# Patient Record
Sex: FEMALE | Race: WHITE | URBAN - METROPOLITAN AREA
[De-identification: names, ages, dates, MRNs, and addresses within clinical notes are randomized per-mention and may not be internally consistent; named-entity substitution may affect disease eponyms.]

---

## 2017-04-21 ENCOUNTER — OFFICE VISIT (OUTPATIENT)
Dept: OPHTHALMOLOGY | Facility: CLINIC | Age: 37
End: 2017-04-21

## 2017-04-21 DIAGNOSIS — Z53.9 ERRONEOUS ENCOUNTER--DISREGARD: Primary | ICD-10-CM

## 2017-04-21 ASSESSMENT — SLIT LAMP EXAM - LIDS
COMMENTS: NORMAL
COMMENTS: NORMAL

## 2017-04-21 ASSESSMENT — EXTERNAL EXAM - LEFT EYE: OS_EXAM: NORMAL

## 2017-04-21 ASSESSMENT — TONOMETRY
IOP_METHOD: TONOPEN
OD_IOP_MMHG: 15

## 2017-04-21 ASSESSMENT — VISUAL ACUITY
OD_SC: 20/20
METHOD: SNELLEN - LINEAR

## 2017-04-21 ASSESSMENT — EXTERNAL EXAM - RIGHT EYE: OD_EXAM: NORMAL

## 2017-04-21 NOTE — MR AVS SNAPSHOT
After Visit Summary   2017    Kamille Arora    MRN: [id number]           Patient Information     Date Of Birth          1980        Visit Information        Provider Department      2017 6:16 PM B[facility]quez [facility]        Today's Diagnoses     ERRONEOUS ENCOUNTER--DISREGARD    -  1       Follow-ups after your visit        Follow-up notes from your care team     Return in about 4 weeks (around 2017) for Follow Up.      Who to contact     Please call your clinic at [phone] to:    Ask questions about your health    Make or cancel appointments    Discuss your medicines    Learn about your test results    Speak to your doctor   If you have compliments or concerns about an experience at your clinic, or if you wish to file a complaint, please contact [facility] Patient Relations at [phone] or email us at [email or web address]         Additional Information About Your Visit        MyChart Information     Dudat is an electronic gateway that provides easy, online access to your medical records. With GutCheck, you can request a clinic appointment, read your test results, renew a prescription or communicate with your care team.     To sign up for Dudat visit the website at [email or web address]   You will be asked to enter the access code listed below, as well as some personal information. Please follow the directions to create your username and password.     Your access code is: [email or web address]  Expires: 2017  7:53 AM     Your access code will  in 90 days. If you need help or a new code, please contact your [facility] Clinic or call [phone] for assistance.        Care EveryWhere ID     This is your Care EveryWhere ID. This could be used by other organizations to access your [place] medical records  [id number]         Blood Pressure from Last 3 Encounters:   No data found for BP    Weight  from Last 3 Encounters:   No data found for Wt              Today, you had the following     No orders found for display       Primary Care Provider    None Specified       No primary provider on file.        Thank you!     Thank you for choosing EYE CLINIC  for your care. Our goal is always to provide you with excellent care. Hearing back from our patients is one way we can continue to improve our services. Please take a few minutes to complete the written survey that you may receive in the mail after your visit with us. Thank you!             Your Updated Medication List - Protect others around you: Learn how to safely use, store and throw away your medicines at www.disposemymeds.org.      Notice  As of 4/21/2017 11:59 PM    You have not been prescribed any medications.

## 2017-04-21 NOTE — PROGRESS NOTES
Kamille Arora is a 37 year old female who presents complaining of new floater in right eye that started today. Reports it does not move. Denies flashing lights, curtain, scotoma. Has had some floaters previously. Dilated and tonopen pressure by herself.    Floater  No juliette sign, tear, hole identified  Given retinal detachment precautions, to return earlier if having flashing lights, new floaters, curtain coming down  Some epithelial irregularity, recommended artificial tears.  Re-examine in 1 month.    Bola Bojorquez MD

## 2017-06-19 ENCOUNTER — OFFICE VISIT (OUTPATIENT)
Dept: AUDIOLOGY | Facility: CLINIC | Age: 37
End: 2017-06-19

## 2017-06-19 DIAGNOSIS — H90.72 MIXED HEARING LOSS OF LEFT EAR: Primary | ICD-10-CM

## 2017-06-19 DIAGNOSIS — H93.8X3 FULLNESS IN EAR, BILATERAL: ICD-10-CM

## 2017-06-19 NOTE — MR AVS SNAPSHOT
After Visit Summary   2017    Kamille Arora    MRN: [id number]           Patient Information     Date Of Birth          1980        Visit Information        Provider Department      2017 8:30 AM Michelle Escoto AuD [facility] Audiology        Today's Diagnoses     Mixed hearing loss of left ear    -  1    Fullness in ear, bilateral           Follow-ups after your visit        Your next 10 appointments already scheduled     2017 10:30 AM CDT   (Arrive by 10:15 AM)   NEW NEUROTOLOGY VISIT with Rick L Nissen, MD   [facility] Ear Nose and Throat ([facility])    [address] [place] [id number] [phone]              Who to contact     Please call your clinic at [phone] to:    Ask questions about your health    Make or cancel appointments    Discuss your medicines    Learn about your test results    Speak to your doctor   If you have compliments or concerns about an experience at your clinic, or if you wish to file a complaint, please contact [facility] Patient Relations at [phone] or email us at [email or web address]         Additional Information About Your Visit        MyChart Information     KnowledgeTreet is an electronic gateway that provides easy, online access to your medical records. With Agrivi, you can request a clinic appointment, read your test results, renew a prescription or communicate with your care team.     To sign up for KnowledgeTreet visit the website at [email or web address]   You will be asked to enter the access code listed below, as well as some personal information. Please follow the directions to create your username and password.     Your access code is: [email or web address]  Expires: 2017  8:17 AM     Your access code will  in 90 days. If you need help or a new code, please contact your [facility] or call [phone] for  assistance.        Care EveryWhere ID     This is your Care EveryWhere ID. This could be used by other organizations to access your [place] medical records  [id number]         Blood Pressure from Last 3 Encounters:   No data found for BP    Weight from Last 3 Encounters:   No data found for Wt              We Performed the Following     AUDIOGRAM/TYMPANOGRAM - INTERFACE     [facility] (39435[facility]     Tymps / Reflex   ([id number])        Primary Care Provider    None Specified       No primary provider on file.        Thank you!     Thank you for choosing [facility] AUDIOLOGY  for your care. Our goal is always to provide you with excellent care. Hearing back from our patients is one way we can continue to improve our services. Please take a few minutes to complete the written survey that you may receive in the mail after your visit with us. Thank you!             Your Updated Medication List - Protect others around you: Learn how to safely use, store and throw away your medicines at www.disposemymeds.org.      Notice  As of 6/19/2017 11:23 AM    You have not been prescribed any medications.

## 2017-06-19 NOTE — PROGRESS NOTES
AUDIOLOGY REPORT    SUMMARY: Audiology visit completed. See audiogram for results.      RECOMMENDATIONS: Follow-up with ENT.      Misha Jang  Audiologist  [place] License  #[id number]

## 2017-06-20 ENCOUNTER — OFFICE VISIT (OUTPATIENT)
Dept: OTOLARYNGOLOGY | Facility: CLINIC | Age: 37
End: 2017-06-20

## 2017-06-20 VITALS — WEIGHT: 228 LBS | BODY MASS INDEX: 36.64 KG/M2 | HEIGHT: 66 IN

## 2017-06-20 DIAGNOSIS — H69.92 ETD (EUSTACHIAN TUBE DYSFUNCTION), LEFT: ICD-10-CM

## 2017-06-20 DIAGNOSIS — H90.12 CONDUCTIVE HEARING LOSS IN LEFT EAR: Primary | ICD-10-CM

## 2017-06-20 RX ORDER — FLUTICASONE PROPIONATE 50 MCG
2 SPRAY, SUSPENSION (ML) NASAL DAILY
Qty: 1 BOTTLE | Refills: 2 | Status: SHIPPED | OUTPATIENT
Start: 2017-06-20 | End: 2017-07-20

## 2017-06-20 RX ORDER — PNV NO.95/FERROUS FUM/FOLIC AC 28MG-0.8MG
TABLET ORAL
COMMUNITY

## 2017-06-20 RX ORDER — COVID-19 ANTIGEN TEST
KIT MISCELLANEOUS
COMMUNITY
Start: 2012-12-18

## 2017-06-20 RX ORDER — MULTIVIT-MIN/IRON/FOLIC ACID/K 18-600-40
CAPSULE ORAL
COMMUNITY

## 2017-06-20 RX ORDER — CALCIUM CARBONATE 500 MG/1
TABLET, CHEWABLE ORAL
COMMUNITY
Start: 2009-07-17

## 2017-06-20 RX ORDER — MULTIVITAMIN,THER AND MINERALS
TABLET ORAL
COMMUNITY
Start: 2005-02-09

## 2017-06-20 ASSESSMENT — PAIN SCALES - GENERAL: PAINLEVEL: NO PAIN (0)

## 2017-06-20 NOTE — PATIENT INSTRUCTIONS
Thank you for your visit to Dr. Rick Nissen, Neurotologist, today. A prescription for Flonase has been sent to the [facility] in [employer] on [address].    Thank you.

## 2017-06-20 NOTE — PROGRESS NOTES
Dear  No primary care provider on file.:    I had the pleasure of meeting Kamille Arora in consultation today at the [facility] at your request.    HISTORY OF PRESENT ILLNESS: The patient is a 37-year-old in today for assessment of left hearing loss.  Two or three weeks ago she was having a lot of coughing, some sinus symptoms and both ears felt plugged and full.  She was seen in urgent care and felt to have double ear infection and was put on Augmentin.  She had previously been on a Z-ESTER.  Her symptoms now have gotten better other than the left ear still feels plugged and full.  Hearing seems very muffled.  She denies any dizziness.  There has been no drainage.         ALLERGIES:  No Known Allergies    HABITS:   Alcohol use No    History   Smoking Status     Never Smoker   Smokeless Tobacco     Not on file         PAST MEDICAL HISTORY: Please see today's intake form (for the remainder of the PMH) which I reviewed and signed.  Past Medical History:   Diagnosis Date     Conductive hearing loss      Recurrent otitis media      Sensorineural hearing loss      Tinnitus        FAMILY HISTORY/SOCIAL HISTORY:   Family History   Problem Relation Age of Onset     DIABETES Mother      Thyroid Disease Mother      HEART DISEASE Maternal Grandfather            Social History     Social History     Marital status:      Spouse name: N/A     Number of children: N/A     Years of education: N/A     Occupational History     Not on file.     Social History Main Topics     Smoking status: Never Smoker     Smokeless tobacco: Not on file     Alcohol use No     Drug use: No     Sexual activity: Yes     Partners: Male     Birth control/ protection: None     Other Topics Concern     Not on file     Social History Narrative     No narrative on file       REVIEW OF SYSTEMS: Please see today's intake form (for the remainder of the ROS) which I have reviewed and signed.    PHYSICIAL  EXAMINATION:  Constitutional: The patient was well-groomed and in no acute distress.   Skin: Warm and pink.  Psychiatric: The patient's affect was calm, cooperative, and appropriate.   Respiratory: Breathing comfortably without stridor or exertion of accessory muscles.  Eyes: Pupils were equal and reactive. Extraocular movement intact.   Head: Normocephalic and atraumatic. No lesions or scars.  Ears: Both ears are examined under the microscope.  Under high-powered magnification, the right side was cleaned with curettes of cerumen.  Following cleaning, the tympanic membrane and middle ear look normal.  The left ear was cleaned and examined using the microscope, curette and similar techniques.  Tympanic membrane shows some mild retraction but does seem to move with Valsalva.  Just does not clear completely for her.   Nose: Sinuses were nontender. Anterior rhinoscopy revealed midline septum and absence of purulence or polyps.  Oral Cavity: Normal tongue, floor of moth, buccal mucosa, and palate. No lesions or masses on inspection or palpation. No abnormal lymph tissue in the oropharynx.   Neck: The parotid is soft without masses. Supple with normal laryngeal and tracheal landmarks.   Lymphatic: There is no palpable lymphadenopathy or other masses in the neck.   Neurologic: Alert and oriented x 3. Cranial nerves III-XI within normal limits. Voice quality normal.  Cerebellar Function Tests:  Grossly normal    Audiogram:  AUDIOGRAM:  Audiogram performed shows normal hearing on the right ear.  The left ear shows a similar nerve line with a mild conductive component from 250-1000 Hz.  Shallow peak on the left tympanogram as well.  Tuning forks show positive responses in each ear, but the Carrasquillo does lateralize to the left.         IMPRESSION AND PLAN: I talked with her for some time and went over what looks to be left eustachian tube dysfunction.  We discussed this in detail.  I talked to her about frequent Valsalvas and  going on a nasal steroid spray to help open eustachian tube.  This was discussed in detail.  Multiple questions answered.  We are going to give this a trial and if it does not clear completely, we will see her back in a month.  She will go on Flonase two puffs once a day in each nostril on a regular basis and to finish the bottle.  Frequent Valsalvas also encouraged.  We will see her back in one month if symptoms do not clear, may consider a tube.           Thank you very much for the opportunity to participate in the care of your patient.    Rick L Nissen MD

## 2017-06-20 NOTE — LETTER
Date:June 28, 2017      Patient was self referred, no letter generated. Do not send.        [facility] Health Information

## 2017-06-20 NOTE — MR AVS SNAPSHOT
After Visit Summary   2017    Kamille Arora    MRN: [id number]           Patient Information     Date Of Birth          1980        Visit Information        Provider Department      2017 10:30 AM Nissen, Rick L, MD [facility] N[facility] Thro[facility]        Today's Diagnoses     Mixed hearing loss    -  1      Care Instructions    Thank you for your visit to Dr. Rick Nissen, Neurotologist, today. A prescription for Flonase has been sent to the [facility] in [employer] on [facility] [address].    Thank you.          Follow-ups after your visit        Who to contact     Please call your clinic at [phone] to:    Ask questions about your health    Make or cancel appointments    Discuss your medicines    Learn about your test results    Speak to your doctor   If you have compliments or concerns about an experience at your clinic, or if you wish to file a complaint, please contact [facility] Patient Relations at [phone] or email us at [email or web address]         Additional Information About Your Visit        MyChart Information     [employer] is an electronic gateway that provides easy, online access to your medical records. With [employer] you can request a clinic appointment, read your test results, renew a prescription or communicate with your care team.     To sign up for [employer] visit the website at [email or web address]   You will be asked to enter the access code listed below, as well as some personal information. Please follow the directions to create your username and password.     Your access code is: [email or web address]  Expires: 2017  8:17 AM     Your access code will  in 90 days. If you need help or a new code, please contact your [facility] or call [phone] for assistance.        Care EveryWhere ID     This is your Care EveryWhere ID. This could be used by other organizations to access your [place]  "medical records  [id number]        Your Vitals Were     Height BMI (Body Mass Index)                1.664 m (5' 5.5\") 37.36 kg/m2           Blood Pressure from Last 3 Encounters:   No data found for BP    Weight from Last 3 Encounters:   06/20/17 103.4 kg (228 lb)              Today, you had the following     No orders found for display         Today's Medication Changes          These changes are accurate as of: 6/20/17 11:17 AM.  If you have any questions, ask your nurse or doctor.               Start taking these medicines.        Dose/Directions    fluticasone 50 MCG/ACT spray   Commonly known as:  FLONASE   Used for:  Mixed hearing loss   Started by:  Nissen, Rick L, MD        Dose:  2 spray   Spray 2 sprays into both nostrils daily   Quantity:  1 Bottle   Refills:  2            Where to get your medicines      These medications were sent to [place] [address] [place] [address] [address]     Phone:  [phone]     fluticasone 50 MCG/ACT spray                Primary Care Provider    None Specified       No primary provider on file.        Thank you!     Thank you for choosing [facility] EAR NOSE AND THROAT  for your care. Our goal is always to provide you with excellent care. Hearing back from our patients is one way we can continue to improve our services. Please take a few minutes to complete the written survey that you may receive in the mail after your visit with us. Thank you!             Your Updated Medication List - Protect others around you: Learn how to safely use, store and throw away your medicines at www.disposemymeds.org.          This list is accurate as of: 6/20/17 11:17 AM.  Always use your most recent med list.                   Brand Name Dispense Instructions for use    ANTACID 500 MG chewable tablet   Generic drug:  calcium carbonate          fluticasone 50 MCG/ACT spray    FLONASE    1 Bottle    Spray 2 sprays into both nostrils daily "       naproxen sodium 220 MG capsule          Prenatal Vitamin 27-0.8 MG Tabs          vitamin  s/Minerals Tabs          Vitamin C 500 MG Caps

## 2017-06-20 NOTE — LETTER
6/20/2017       RE: Kamille Arora  [address]  [place] [address]     Dear Colleague,    Thank you for referring your patient, Kamille Arora, to the [facility] EAR NOSE AND THROAT at [facility]. Please see a copy of my visit note below.    Dear Dr. Yusuf primary care provider on file.:    I had the pleasure of meeting Kamille Arora in consultation today at the [facility] Otolaryngology Clinic at your request.    HISTORY OF PRESENT ILLNESS: The patient is a 37-year-old in today for assessment of left hearing loss.  Two or three weeks ago she was having a lot of coughing, some sinus symptoms and both ears felt plugged and full.  She was seen in urgent care and felt to have double ear infection and was put on Augmentin.  She had previously been on a Z-ESTER.  Her symptoms now have gotten better other than the left ear still feels plugged and full.  Hearing seems very muffled.  She denies any dizziness.  There has been no drainage.         ALLERGIES:  No Known Allergies    HABITS:   Alcohol use No    History   Smoking Status     Never Smoker   Smokeless Tobacco     Not on file         PAST MEDICAL HISTORY: Please see today's intake form (for the remainder of the PMH) which I reviewed and signed.  Past Medical History:   Diagnosis Date     Conductive hearing loss      Recurrent otitis media      Sensorineural hearing loss      Tinnitus        FAMILY HISTORY/SOCIAL HISTORY:   Family History   Problem Relation Age of Onset     DIABETES Mother      Thyroid Disease Mother      HEART DISEASE Maternal Grandfather            Social History     Social History     Marital status:      Spouse name: N/A     Number of children: N/A     Years of education: N/A     Occupational History     Not on file.     Social History Main Topics     Smoking status: Never Smoker     Smokeless tobacco: Not on file     Alcohol use No     Drug use: No     Sexual activity: Yes     Partners:  Male     Birth control/ protection: None     Other Topics Concern     Not on file     Social History Narrative     No narrative on file       REVIEW OF SYSTEMS: Please see today's intake form (for the remainder of the ROS) which I have reviewed and signed.    PHYSICIAL EXAMINATION:  Constitutional: The patient was well-groomed and in no acute distress.   Skin: Warm and pink.  Psychiatric: The patient's affect was calm, cooperative, and appropriate.   Respiratory: Breathing comfortably without stridor or exertion of accessory muscles.  Eyes: Pupils were equal and reactive. Extraocular movement intact.   Head: Normocephalic and atraumatic. No lesions or scars.  Ears: Both ears are examined under the microscope.  Under high-powered magnification, the right side was cleaned with curettes of cerumen.  Following cleaning, the tympanic membrane and middle ear look normal.  The left ear was cleaned and examined using the microscope, curette and similar techniques.  Tympanic membrane shows some mild retraction but does seem to move with Valsalva.  Just does not clear completely for her.   Nose: Sinuses were nontender. Anterior rhinoscopy revealed midline septum and absence of purulence or polyps.  Oral Cavity: Normal tongue, floor of moth, buccal mucosa, and palate. No lesions or masses on inspection or palpation. No abnormal lymph tissue in the oropharynx.   Neck: The parotid is soft without masses. Supple with normal laryngeal and tracheal landmarks.   Lymphatic: There is no palpable lymphadenopathy or other masses in the neck.   Neurologic: Alert and oriented x 3. Cranial nerves III-XI within normal limits. Voice quality normal.  Cerebellar Function Tests:  Grossly normal    Audiogram:  AUDIOGRAM:  Audiogram performed shows normal hearing on the right ear.  The left ear shows a similar nerve line with a mild conductive component from 250-1000 Hz.  Shallow peak on the left tympanogram as well.  Tuning forks show positive  responses in each ear, but the Carrasquillo does lateralize to the left.         IMPRESSION AND PLAN: I talked with her for some time and went over what looks to be left eustachian tube dysfunction.  We discussed this in detail.  I talked to her about frequent Valsalvas and going on a nasal steroid spray to help open eustachian tube.  This was discussed in detail.  Multiple questions answered.  We are going to give this a trial and if it does not clear completely, we will see her back in a month.  She will go on Flonase two puffs once a day in each nostril on a regular basis and to finish the bottle.  Frequent Valsalvas also encouraged.  We will see her back in one month if symptoms do not clear, may consider a tube.           Thank you very much for the opportunity to participate in the care of your patient.    Rick L Nissen MD          Again, thank you for allowing me to participate in the care of your patient.      Sincerely,    Rick L. Nissen, MD

## 2022-11-07 ENCOUNTER — LAB REQUISITION (OUTPATIENT)
Dept: LAB | Facility: CLINIC | Age: 42
End: 2022-11-07
Payer: COMMERCIAL

## 2022-11-07 DIAGNOSIS — Z12.4 ENCOUNTER FOR SCREENING FOR MALIGNANT NEOPLASM OF CERVIX: ICD-10-CM

## 2022-11-07 PROCEDURE — 88141 CYTOPATH C/V INTERPRET: CPT | Performed by: PATHOLOGY

## 2022-11-07 PROCEDURE — 87624 HPV HI-RISK TYP POOLED RSLT: CPT | Mod: ORL | Performed by: NURSE PRACTITIONER

## 2022-11-07 PROCEDURE — G0145 SCR C/V CYTO,THINLAYER,RESCR: HCPCS | Mod: ORL | Performed by: NURSE PRACTITIONER

## 2022-11-10 LAB
BKR LAB AP GYN ADEQUACY: ABNORMAL
BKR LAB AP GYN INTERPRETATION: ABNORMAL
BKR LAB AP HPV REFLEX: ABNORMAL
BKR LAB AP LMP: ABNORMAL
BKR LAB AP PREVIOUS ABNL DX: ABNORMAL
BKR LAB AP PREVIOUS ABNORMAL: ABNORMAL
PATH REPORT.COMMENTS IMP SPEC: ABNORMAL
PATH REPORT.COMMENTS IMP SPEC: ABNORMAL
PATH REPORT.RELEVANT HX SPEC: ABNORMAL

## 2022-11-11 LAB
HUMAN PAPILLOMA VIRUS 16 DNA: NEGATIVE
HUMAN PAPILLOMA VIRUS 18 DNA: NEGATIVE
HUMAN PAPILLOMA VIRUS FINAL DIAGNOSIS: NORMAL
HUMAN PAPILLOMA VIRUS OTHER HR: NEGATIVE

## 2023-10-26 PROCEDURE — 88305 TISSUE EXAM BY PATHOLOGIST: CPT | Mod: 26 | Performed by: PATHOLOGY

## 2023-10-26 PROCEDURE — 88305 TISSUE EXAM BY PATHOLOGIST: CPT | Mod: TC,ORL | Performed by: OBSTETRICS & GYNECOLOGY

## 2023-10-27 ENCOUNTER — LAB REQUISITION (OUTPATIENT)
Dept: LAB | Facility: CLINIC | Age: 43
End: 2023-10-27
Payer: COMMERCIAL

## 2023-10-30 LAB
PATH REPORT.COMMENTS IMP SPEC: NORMAL
PATH REPORT.COMMENTS IMP SPEC: NORMAL
PATH REPORT.FINAL DX SPEC: NORMAL
PATH REPORT.GROSS SPEC: NORMAL
PATH REPORT.MICROSCOPIC SPEC OTHER STN: NORMAL
PATH REPORT.RELEVANT HX SPEC: NORMAL
PHOTO IMAGE: NORMAL

## 2024-07-29 ENCOUNTER — APPOINTMENT (OUTPATIENT)
Dept: URBAN - METROPOLITAN AREA CLINIC 259 | Age: 44
Setting detail: DERMATOLOGY
End: 2024-07-30

## 2024-07-29 DIAGNOSIS — D22 MELANOCYTIC NEVI: ICD-10-CM

## 2024-07-29 DIAGNOSIS — L91.8 OTHER HYPERTROPHIC DISORDERS OF THE SKIN: ICD-10-CM

## 2024-07-29 PROBLEM — D22.5 MELANOCYTIC NEVI OF TRUNK: Status: ACTIVE | Noted: 2024-07-29

## 2024-07-29 PROCEDURE — OTHER SHAVE REMOVAL: OTHER

## 2024-07-29 PROCEDURE — OTHER COUNSELING: OTHER

## 2024-07-29 PROCEDURE — 11200 RMVL SKIN TAGS UP TO&INC 15: CPT | Mod: 59

## 2024-07-29 PROCEDURE — 11300 SHAVE SKIN LESION 0.5 CM/<: CPT

## 2024-07-29 PROCEDURE — OTHER SKIN TAG REMOVAL: OTHER

## 2024-07-29 PROCEDURE — OTHER MIPS QUALITY: OTHER

## 2024-07-29 ASSESSMENT — LOCATION ZONE DERM
LOCATION ZONE: TRUNK
LOCATION ZONE: AXILLAE

## 2024-07-29 ASSESSMENT — LOCATION SIMPLE DESCRIPTION DERM
LOCATION SIMPLE: LEFT AXILLARY VAULT
LOCATION SIMPLE: ABDOMEN

## 2024-07-29 ASSESSMENT — LOCATION DETAILED DESCRIPTION DERM
LOCATION DETAILED: LEFT AXILLARY VAULT
LOCATION DETAILED: LEFT RIB CAGE

## 2024-07-29 NOTE — PROCEDURE: SKIN TAG REMOVAL
Detail Level: Detailed
Medical Necessity Information: It is in your best interest to select a reason for this procedure from the list below. All of these items fulfill various CMS LCD requirements except the new and changing color options.
Include Z78.9 (Other Specified Conditions Influencing Health Status) As An Associated Diagnosis?: No
Add Associated Diagnoses If Applicable When Selecting Medical Necessity: Yes
Medical Necessity Clause: This procedure was medically necessary because the lesions that were treated were:
consent obtained and the risks of skin tag removal was reviewed with the patient including but not limited to bleeding, pigmentary change, infection, pain, and remote possibility of scarring.

## 2024-07-29 NOTE — HPI: SKIN LESION
What Type Of Note Output Would You Prefer (Optional)?: Standard Output
How Severe Is Your Skin Lesion?: mild
Is This A New Presentation, Or A Follow-Up?: Skin Lesion
Additional History: Patient is seeking further treatment of irritated lesion in left armpit. She notes having an additional skin tag in upper left armpit which has been bothersome, and she would like treated.

## 2024-07-29 NOTE — PROCEDURE: SHAVE REMOVAL
Medical Necessity Information: It is in your best interest to select a reason for this procedure from the list below. All of these items fulfill various CMS LCD requirements except the new and changing color options.
Medical Necessity Clause: This procedure was medically necessary because the lesion that was treated was:
Lab: -[id number]
Lab Facility: 0
Detail Level: Detailed
Was A Bandage Applied: Yes
Size Of Lesion In Cm (Required): 0.5
Depth Of Shave: dermis
Biopsy Method: double edge Personna blade
Anesthesia Type: 1% lidocaine with epinephrine and a 1:10 solution of 8.4% sodium bicarbonate
Anesthesia Volume In Cc: 0.3
Hemostasis: Drysol
Wound Care: Petrolatum
Render Path Notes In Note?: No
Consent was obtained from the patient. The risks and benefits to therapy were discussed in detail. Specifically, the risks of infection, scarring, bleeding, prolonged wound healing, incomplete removal, allergy to anesthesia, nerve injury and recurrence were addressed. Prior to the procedure, the treatment site was clearly identified and confirmed by the patient. All components of Universal Protocol/PAUSE Rule completed.
Post-Care Instructions: I reviewed with the patient in detail post-care instructions. Patient is to keep the biopsy site dry overnight, and then cleanse wound daily with soap and water and apply petrolatum daily until healed.
Notification Instructions: Patient will be notified of pathology results. However, patient instructed to call the office if not contacted within 2 weeks.
Billing Type: Third-Party Bill
No. CALDERON screening performed.  STOP BANG Legend: 0-2 = LOW Risk; 3-4 = INTERMEDIATE Risk; 5-8 = HIGH Risk